# Patient Record
Sex: FEMALE | Race: BLACK OR AFRICAN AMERICAN
[De-identification: names, ages, dates, MRNs, and addresses within clinical notes are randomized per-mention and may not be internally consistent; named-entity substitution may affect disease eponyms.]

---

## 2020-02-09 ENCOUNTER — HOSPITAL ENCOUNTER (EMERGENCY)
Dept: HOSPITAL 72 - EMR | Age: 35
Discharge: HOME | End: 2020-02-09
Payer: MEDICAID

## 2020-02-09 VITALS — BODY MASS INDEX: 29.44 KG/M2 | HEIGHT: 62 IN | WEIGHT: 160 LBS

## 2020-02-09 VITALS — SYSTOLIC BLOOD PRESSURE: 131 MMHG | DIASTOLIC BLOOD PRESSURE: 74 MMHG

## 2020-02-09 DIAGNOSIS — G44.209: Primary | ICD-10-CM

## 2020-02-09 PROCEDURE — 99281 EMR DPT VST MAYX REQ PHY/QHP: CPT

## 2020-02-09 NOTE — EMERGENCY ROOM REPORT
History of Present Illness


General


Chief Complaint:  General Complaint


Source:  Patient





Present Illness


HPI


Is a 34-year-old female with no past medical history.  She presents with 

treatment of headache.  Actually headache occurred yesterday.  She says she has 

been working a lot and was working for many hours and was fatigued and tired.  

Still she had a headache and she slept.  She is better now.  No headache now.  

Her work said that she needs a note to come back to work.  Patient denies any 

nausea vomiting.  Denies any fever chills but no pain at this moment.


Allergies:  


Coded Allergies:  


     No Known Allergies (Unverified , 2/9/20)





Patient History


Past Medical History:  see triage record, old chart reviewed


Past Surgical History:  none


Pertinent Family History:  none


Social History:  Denies: smoking


Pregnant Now:  No


Immunizations:  other


Reviewed Nursing Documentation:  PMH: Agreed; PSxH: Agreed





Nursing Documentation-PMH


Past Medical History:  No Stated History





Review of Systems


Eye:  Denies: eye pain, blurred vision


ENT:  Denies: ear pain, nose congestion, throat swelling


Respiratory:  Denies: cough, shortness of breath


Cardiovascular:  Denies: chest pain, palpitations


Gastrointestinal:  Denies: abdominal pain, diarrhea, nausea, vomiting


Musculoskeletal:  Denies: back pain, joint pain


Skin:  Denies: rash


Neurological:  Reports: headache; Denies: numbness


Endocrine:  Denies: increased thirst, increased urine


Hematologic/Lymphatic:  Denies: easy bruising


All Other Systems:  negative except mentioned in HPI





Physical Exam





Vital Signs








  Date Time  Temp Pulse Resp B/P (MAP) Pulse Ox O2 Delivery O2 Flow Rate FiO2


 


2/9/20 23:43 98.1 81 16 131/74 (93) 98 Room Air  





Also normal


Sp02 EP Interpretation:  reviewed, normal


General Appearance:  well appearing, no apparent distress, alert


Head:  normocephalic, atraumatic


Eyes:  bilateral eye PERRL, bilateral eye EOMI


ENT:  hearing grossly normal, normal pharynx


Neck:  full range of motion, supple, no meningismus


Respiratory:  chest non-tender, lungs clear, normal breath sounds


Cardiovascular #1:  regular rate, rhythm, no murmur


Gastrointestinal:  normal bowel sounds, non tender, no mass, no organomegaly, 

no bruit, non-distended


Musculoskeletal:  back normal, normal range of motion, gait/station normal


Psychiatric:  mood/affect normal





Medical Decision Making


Diagnostic Impression:  


 Primary Impression:  


 Headache


 Qualified Codes:  G44.209 - Tension-type headache, unspecified, not intractable


ER Course


Patient with a headache that resolved.  No evidence of TIA, CVA, meningitis, 

bleed or other neoplastic process.  Will discharge home.





Last Vital Signs








  Date Time  Temp Pulse Resp B/P (MAP) Pulse Ox O2 Delivery O2 Flow Rate FiO2


 


2/9/20 23:43 98.1 81 16 131/74 (93) 98 Room Air  








Status:  unchanged


Disposition:  HOME, SELF-CARE


Condition:  Stable





Additional Instructions:  


Follow-up with your doctor in 7 days.  Return if worse.











Mal Barnard MD Feb 9, 2020 23:55

## 2020-02-09 NOTE — NUR
ED Nurse Note:



pt presents to ED needing a return to work note. pt states that she missed work 
and never received a return to work note, her job would not let her come back 
without one. pt denies any pain or symptoms  at this time. would like a note 
for 1-2 days in order to return to work

## 2020-02-09 NOTE — NUR
ED Nurse Note:



Pt cleared by health care Provider for discharge.  DC instructions and work 
note were given and explained to pt and verbalized understanding of teachings. 
All medical deviecs such as ID band  removed. Pt is AAO x4, ambulatory and left 
with all personal belongings.